# Patient Record
(demographics unavailable — no encounter records)

---

## 2025-03-12 NOTE — REASON FOR VISIT
[Behavioral Health Urgent Care Assessment] : a behavioral health urgent care assessment [School] : school [Patient] : patient [Self] : alone [Father] : with father [Language Line ] : provided by Language Line   [Time Spent: ____ minutes] : Total time spent using  services: [unfilled] minutes. The patient's primary language is not English thus required  services. [TextBox_17] : for help connecting to outpatient services for presenting self-injurious behavior.  [Interpreters_IDNumber] : [id number] [Interpreters_FullName] : Josiah [TWNoteComboBox1] : Liberian

## 2025-03-12 NOTE — PHYSICAL EXAM
[Agitated] : agitated [Anxious] : anxious [Full] : full [Clear] : clear [Linear/Goal Directed] : linear/goal directed [None Reported] : none reported [Average] : average [WNL] : within normal limits [Positive interaction] : positive interaction [Unremarkable/age appropriate] : unremarkable/age appropriate [Normal] : normal [None] : none [de-identified] : fidgeting, psycho-motor agitation likely related to anxiety

## 2025-03-12 NOTE — SOCIAL HISTORY
[No Known Substance Use] : no known substance use [FreeTextEntry1] : Patient is an 11year-old female, domiciled with parents and siblings, enrolled in [employer] school, in the 6th grade regular education, previously working with therapist at [facility] (treatment discontinued last year), no h/o psychiatric hospitalizations, no hx of suicide attempts, hx of NSSIB cutting, no h/o aggression/violence/legal issues, no CPS involvement, no substance use, no known trauma hx, no significant pmhx, now presenting accompanied by father, referred by school SW for safety assessment and help connecting to care after pt endorsed engaging in NSSIB cutting.
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
## 2025-03-12 NOTE — SOCIAL HISTORY
[No Known Substance Use] : no known substance use [FreeTextEntry1] : Patient is an 11year-old female, domiciled with parents and siblings, enrolled in [employer] school, in the 6th grade regular education, [place] [facility] [place]), no h/o psychiatric hospitalizations, no hx of suicide attempts, hx of NSSIB cutting, no h/o aggression/violence/legal issues, no CPS involvement, no substance use, no known trauma hx, no significant pmhx, now presenting accompanied by father, referred by school SW for safety assessment and help connecting to care after pt endorsed engaging in NSSIB cutting.

## 2025-03-12 NOTE — PLAN
[Provision of National Suicide Prevention Lifeline 6-699-694-TALK (4874)] : Provision of national suicide prevention lifeline 9-554-089-talk (9307) [Patient] : patient [Family] : family [Education provided regarding environmental safety/ lethal means restriction] : Education provided regarding environmental safety/ lethal means restriction [Contact was Attempted] : contact was attempted [TextBox_9] : Referral to therapy and psychiatry. Back to [facility].  [TextBox_13] : see scanned safety plan.  [TextBox_26] : school consent provided and contacted regarding the above recommendation.

## 2025-03-12 NOTE — SOCIAL HISTORY
[No Known Substance Use] : no known substance use [FreeTextEntry1] : Patient is an 11year-old female, domiciled with parents and siblings, enrolled in [employer] school, in the 6th grade regular education, previously working with therapist at [facility] [place] [place] (treatment discontinued last year), no h/o psychiatric hospitalizations, no hx of suicide attempts, hx of NSSIB cutting, no h/o aggression/violence/legal issues, no CPS involvement, no substance use, no known trauma hx, no significant pmhx, now presenting accompanied by father, referred by school SW for safety assessment and help connecting to care after pt endorsed engaging in NSSIB cutting.

## 2025-03-12 NOTE — PLAN
[Provision of National Suicide Prevention Lifeline 0-732-279-TALK (1508)] : Provision of national suicide prevention lifeline 9-544-201-talk (7138) [Patient] : patient [Family] : family [Education provided regarding environmental safety/ lethal means restriction] : Education provided regarding environmental safety/ lethal means restriction [Contact was Attempted] : contact was attempted [TextBox_9] : Referral to therapy and psychiatry. Back to [facility].  [TextBox_13] : see scanned safety plan.  [TextBox_26] : school consent provided and contacted regarding the above recommendation.

## 2025-03-12 NOTE — PLAN
[Provision of National Suicide Prevention Lifeline 9-687-782-TALK (4324)] : Provision of national suicide prevention lifeline 8-316-284-talk (0403) [Patient] : patient [Family] : family [Education provided regarding environmental safety/ lethal means restriction] : Education provided regarding environmental safety/ lethal means restriction [Contact was Attempted] : contact was attempted [TextBox_9] : Referral to therapy and psychiatry. Back to [facility].  [TextBox_13] : see scanned safety plan.  [TextBox_26] : school consent provided and contacted regarding the above recommendation.

## 2025-03-12 NOTE — PLAN
[Provision of National Suicide Prevention Lifeline 2-443-569-TALK (4627)] : Provision of national suicide prevention lifeline 5-024-324-talk (0630) [Patient] : patient [Family] : family [Education provided regarding environmental safety/ lethal means restriction] : Education provided regarding environmental safety/ lethal means restriction [Contact was Attempted] : contact was attempted [TextBox_9] : Referral to therapy and psychiatry. Back to [facility].  [TextBox_13] : see scanned safety plan.  [TextBox_26] : school consent provided and contacted regarding the above recommendation.

## 2025-03-12 NOTE — REASON FOR VISIT
[Behavioral Health Urgent Care Assessment] : a behavioral health urgent care assessment [School] : school [Patient] : patient [Self] : alone [Father] : with father [Language Line ] : provided by Language Line   [Time Spent: ____ minutes] : Total time spent using  services: [unfilled] minutes. The patient's primary language is not English thus required  services. [TextBox_17] : for help connecting to outpatient services for presenting self-injurious behavior.  [Interpreters_IDNumber] : [id number] [Interpreters_FullName] : Josiah [TWNoteComboBox1] : Maldivian

## 2025-03-12 NOTE — PLAN
[Provision of National Suicide Prevention Lifeline 8-925-573-TALK (9061)] : Provision of national suicide prevention lifeline 1-188-693-talk (8844) [Patient] : patient [Family] : family [Education provided regarding environmental safety/ lethal means restriction] : Education provided regarding environmental safety/ lethal means restriction [Contact was Attempted] : contact was attempted [TextBox_9] : Referral to therapy and psychiatry. Back to [facility].  [TextBox_13] : see scanned safety plan.  [TextBox_26] : school consent provided and contacted regarding the above recommendation.

## 2025-03-12 NOTE — DISCUSSION/SUMMARY
[Low acute suicide risk] : Low acute suicide risk [Yes] : Safety Plan completed/updated (for individuals at risk): Yes [FreeTextEntry1] : At present, patient has a low acute risk of harm to self.  Although patient has risk factors including history of NSSIB cutting, as well as history of SI, Patient has significant protective factors including strong family/social support, domiciled, age, no suicide attempts, no substance use, no brent, no psychosis, no CAH, no psychiatric hospitalization, current willingness to engage in treatment, participation in safety planning, future orientation with long & short term goals for the future, hopeful, help-seeking, engaged in school & activities, current denial of any SIIP or urges to self-harm, no reported hx of abuse/trauma, no aggression/violence, no access to guns/family is able to means restrict, no legal history. [FreeTextEntry3] : see scanned safety plan.
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
## 2025-03-12 NOTE — PHYSICAL EXAM
[Agitated] : agitated [Anxious] : anxious [Full] : full [Clear] : clear [Linear/Goal Directed] : linear/goal directed [None Reported] : none reported [Average] : average [WNL] : within normal limits [Positive interaction] : positive interaction [Unremarkable/age appropriate] : unremarkable/age appropriate [Normal] : normal [None] : none [de-identified] : fidgeting, psycho-motor agitation likely related to anxiety

## 2025-03-12 NOTE — PHYSICAL EXAM
[Agitated] : agitated [Anxious] : anxious [Full] : full [Clear] : clear [Linear/Goal Directed] : linear/goal directed [None Reported] : none reported [Average] : average [WNL] : within normal limits [Positive interaction] : positive interaction [Unremarkable/age appropriate] : unremarkable/age appropriate [Normal] : normal [None] : none [de-identified] : fidgeting, psycho-motor agitation likely related to anxiety

## 2025-03-12 NOTE — SOCIAL HISTORY
[No Known Substance Use] : no known substance use [FreeTextEntry1] : Patient is an 11year-old female, domiciled with parents and siblings, enrolled in [employer] school, in the 6th grade regular education, previously working with therapist at [facility] (treatment discontinued last year), no h/o psychiatric hospitalizations, no hx of suicide attempts, hx of NSSIB cutting, no h/o aggression/violence/legal issues, no CPS involvement, no substance use, no known trauma hx, no significant pmhx, now presenting accompanied by father, referred by school SW for safety assessment and help connecting to care after pt endorsed engaging in NSSIB cutting.
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
## 2025-03-12 NOTE — REASON FOR VISIT
[Behavioral Health Urgent Care Assessment] : a behavioral health urgent care assessment [School] : school [Patient] : patient [Self] : alone [Father] : with father [Language Line ] : provided by [employer]   [Time Spent: ____ minutes] : Total time spent using  services: [unfilled] minutes. The patient's primary language is not English thus required  services. [TextBox_17] : for help connecting to outpatient services for presenting self-injurious behavior.  [Interpreters_IDNumber] : [id number] [Interpreters_FullName] : Josiah [TWNoteComboBox1] : Niuean
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
## 2025-03-12 NOTE — PHYSICAL EXAM
[Agitated] : agitated [Anxious] : anxious [Full] : full [Clear] : clear [Linear/Goal Directed] : linear/goal directed [None Reported] : none reported [Average] : average [WNL] : within normal limits [Positive interaction] : positive interaction [Unremarkable/age appropriate] : unremarkable/age appropriate [Normal] : normal [None] : none [de-identified] : fidgeting, psycho-motor agitation likely related to anxiety

## 2025-03-12 NOTE — SOCIAL HISTORY
[No Known Substance Use] : no known substance use [FreeTextEntry1] : Patient is an 11year-old female, domiciled with parents and siblings, enrolled in [employer], in the 6th grade regular education, previously working with therapist at [facility] (treatment discontinued last year), no h/o psychiatric hospitalizations, no hx of suicide attempts, hx of NSSIB cutting, no h/o aggression/violence/legal issues, no CPS involvement, no substance use, no known trauma hx, no significant pmhx, now presenting accompanied by father, referred by school SW for safety assessment and help connecting to care after pt endorsed engaging in NSSIB cutting.

## 2025-03-12 NOTE — REASON FOR VISIT
[Behavioral Health Urgent Care Assessment] : a behavioral health urgent care assessment [School] : school [Patient] : patient [Self] : alone [Father] : with father [Language Line ] : provided by [employer]   [Time Spent: ____ minutes] : Total time spent using  services: [unfilled] minutes. The patient's primary language is not English thus required  services. [TextBox_17] : for help connecting to outpatient services for presenting self-injurious behavior.  [Interpreters_IDNumber] : [id number] [Interpreters_FullName] : Josiah [TWNoteComboBox1] : Cuban

## 2025-03-12 NOTE — PHYSICAL EXAM
[Agitated] : agitated [Anxious] : anxious [Full] : full [Clear] : clear [Linear/Goal Directed] : linear/goal directed [None Reported] : none reported [Average] : average [WNL] : within normal limits [Positive interaction] : positive interaction [Unremarkable/age appropriate] : unremarkable/age appropriate [Normal] : normal [None] : none [de-identified] : fidgeting, psycho-motor agitation likely related to anxiety

## 2025-03-12 NOTE — REASON FOR VISIT
[Behavioral Health Urgent Care Assessment] : a behavioral health urgent care assessment [School] : school [Patient] : patient [Self] : alone [Father] : with father [Language [employer] ] : provided by [employer]   [Time Spent: ____ minutes] : Total time spent using  services: [unfilled] minutes. The patient's primary language is not English thus required  services. [TextBox_17] : for help connecting to outpatient services for presenting self-injurious behavior.  [Interpreters_IDNumber] : [id number] [Interpreters_FullName] : Josiah [TWNoteComboBox1] : Guyanese